# Patient Record
Sex: FEMALE | Race: WHITE
[De-identification: names, ages, dates, MRNs, and addresses within clinical notes are randomized per-mention and may not be internally consistent; named-entity substitution may affect disease eponyms.]

---

## 2018-01-30 ENCOUNTER — HOSPITAL ENCOUNTER (OUTPATIENT)
Dept: HOSPITAL 47 - RADMRIMAIN | Age: 48
Discharge: HOME | End: 2018-01-30
Attending: FAMILY MEDICINE
Payer: COMMERCIAL

## 2018-01-30 DIAGNOSIS — D49.2: Primary | ICD-10-CM

## 2018-01-30 DIAGNOSIS — M51.34: ICD-10-CM

## 2018-01-30 PROCEDURE — 72157 MRI CHEST SPINE W/O & W/DYE: CPT

## 2018-01-30 PROCEDURE — 82565 ASSAY OF CREATININE: CPT

## 2018-01-30 NOTE — MR
EXAMINATION TYPE: MR thoracic spine wo/w con

 

DATE OF EXAM: 1/30/2018

 

COMPARISON: NONE

 

HISTORY: Lytic lesion T4 vertebra per order. Abnormal x-ray at 's office per patient.

 

TECHNIQUE: Multiplanar, multisequence imaging of thoracic spine is performed without contrast

 

FINDINGS: Sagittal T2 counting sequence shows loss of normal cervical curvature with multilevel small
 posterior disc herniations effacing anterior thecal sac C3-C4 through C6-C7 levels.  Spinal cord jalil
ws normal course, caliber, and signal as it courses the thoracic spine.  Vertebral body heights and a
lignment are satisfactory. Vertebral body heights and alignment are satisfactory. Likely correspondin
g to x-ray abnormality there is lesion occupying nearly entire T4 vertebra of diffuse T1 hypointensit
y and T2 hyperintensity and heterogeneous postcontrast enhancement, there is no definitive posterior 
or pedicular extension. There is mild multilevel disc space narrowing with mild to moderate multileve
l spurring most prominent in lower thoracic levels. Multilevel small posterior disc herniations are s
een on sagittal images most prominent at T4-T5 and T6-T7 level effacing anterior thecal sac.

 

 Review of the axial images fails to show left paracentral disc protrusion mildly effacing anterior t
hecal sac C7-T1 level seen best sagittal image 8, bilateral neural foramina are patent.

 

Axial images at T2-T3 level show left paracentral disc protrusion effacing anterolateral thecal sac a
xial image 20, bilateral neural foramina are patent.

 

Axial images at T4 level show well-defined heterogeneous enhancing lesion occupying majority of right
 T4 vertebra without adjacent soft tissue mass or cortical destruction/breakthrough seen best axial i
mage 12.

 

Axial images at T4-T5 level show lobulated posterior disc protrusion effacing anterolateral thecal sa
c, bilateral neural foramina are patent.

 

Axial images at T5-T6 level show lobulated posterior paracentral disc protrusion effacing anterolater
al thecal sac, bilateral neural foramina are patent.

 

Axial images at T6-T7 level show left paracentral disc protrusion effacing anterolateral thecal sac, 
bilateral neural foramina are patent. 

 

Axial images at T9-T10 level show right paracentral disc protrusion effacing anterolateral thecal sac
 near axial image 11, and causing asymmetric mild to moderate right-sided neural foraminal narrowing.
 Left-sided neural foramen is patent.

 

Axial images at T10-T11 level shows paracentral disc protrusion effacing anterolateral thecal sac, bi
lateral neural foramina are patent mild ligamentum flavum hypertrophy is seen.

 

Axial images at T12-L1 level show focal left paracentral disc protrusion effacing anterolateral theca
l sac on axial image 2 series 601, bilateral neural foramina are patent.

 

Visualized thorax and upper abdomen show no suspicious abnormality.

 

IMPRESSION: Nonspecific abnormal lesion T4 vertebra. No obvious aggressive features on MRI however ba
sed on history lytic metastatic disease and myeloma remain in differential. Consider hematology oncol
ogy referral and advised correlation with bone survey. Multilevel degenerative changes in thoracic sp
ine are seen as detailed above.

## 2019-04-27 ENCOUNTER — HOSPITAL ENCOUNTER (EMERGENCY)
Dept: HOSPITAL 47 - EC | Age: 49
Discharge: HOME | End: 2019-04-27
Payer: COMMERCIAL

## 2019-04-27 VITALS — SYSTOLIC BLOOD PRESSURE: 133 MMHG | HEART RATE: 76 BPM | DIASTOLIC BLOOD PRESSURE: 77 MMHG

## 2019-04-27 VITALS — RESPIRATION RATE: 18 BRPM

## 2019-04-27 VITALS — TEMPERATURE: 100.4 F

## 2019-04-27 DIAGNOSIS — Z88.1: ICD-10-CM

## 2019-04-27 DIAGNOSIS — J18.1: Primary | ICD-10-CM

## 2019-04-27 LAB
PH UR: 5.5 [PH] (ref 5–8)
RBC UR QL: 1 /HPF (ref 0–5)
SP GR UR: 1.02 (ref 1–1.03)
SQUAMOUS UR QL AUTO: 8 /HPF (ref 0–4)
UROBILINOGEN UR QL STRIP: <2 MG/DL (ref ?–2)
WBC #/AREA URNS HPF: 2 /HPF (ref 0–5)

## 2019-04-27 PROCEDURE — 87086 URINE CULTURE/COLONY COUNT: CPT

## 2019-04-27 PROCEDURE — 87502 INFLUENZA DNA AMP PROBE: CPT

## 2019-04-27 PROCEDURE — 81001 URINALYSIS AUTO W/SCOPE: CPT

## 2019-04-27 PROCEDURE — 71046 X-RAY EXAM CHEST 2 VIEWS: CPT

## 2019-04-27 PROCEDURE — 99284 EMERGENCY DEPT VISIT MOD MDM: CPT

## 2019-04-27 NOTE — XR
EXAMINATION TYPE: XR chest 2V

 

DATE OF EXAM: 4/27/2019

 

HISTORY: Fever.

 

REFERENCE: NONE.

 

FINDINGS: There is a left lower lobe infiltrate. Right lung is clear. Pleural space are clear. The he
art is not enlarged.

 

IMPRESSION: 

 

LEFT LOWER LOBE PNEUMONIA.

## 2019-04-27 NOTE — ED
Fever HPI





- General


Chief Complaint: Fever


Stated Complaint: Fever


Time Seen by Provider: 19 11:34


Source: patient, RN notes reviewed, old records reviewed


Mode of arrival: ambulatory


Limitations: no limitations





- History of Present Illness


Initial Comments: 





Patient's a 48-year-old female presents emergency room today with complaints of 

fever bodyaches for the past 2 days.  Patient has had Motrin Tylenol for the 

past 2 days she complies a slight cough today.  She denies any history of sick 

contacts.  She is otherwise generally healthy.  Denies any other symptoms.





- Related Data


                                  Previous Rx's











 Medication  Instructions  Recorded


 


Albuterol Inhaler [Ventolin Hfa 1 - 2 puff INHALATION RT-Q6H PRN 19





Inhaler] #1 inhaler 


 


Azithromycin [Zithromax Z-pack] 250 mg PO AS DIRECTED #6 tab 19


 


methylPREDNISolone Dose Pack 4 mg PO AS DIRECTED #21 package 19





[Medrol Dose Pack]  











                                    Allergies











Allergy/AdvReac Type Severity Reaction Status Date / Time


 


cefaclor [From Ceclor] Allergy  Rash/Hives Verified 19 11:24














Review of Systems


ROS Statement: 


Those systems with pertinent positive or pertinent negative responses have been 

documented in the HPI.





ROS Other: All systems not noted in ROS Statement are negative.





Past Medical History


Past Medical History: No Reported History


History of Any Multi-Drug Resistant Organisms: None Reported


Past Surgical History:  Section


Additional Past Surgical History / Comment(s): kidney biopsy


Past Psychological History: No Psychological Hx Reported


Smoking Status: Never smoker


Past Alcohol Use History: None Reported


Past Drug Use History: None Reported





General Exam





- General Exam Comments


Initial Comments: 





Well-appearing alert and oriented 48-year-old female.  No significant distress.


Limitations: no limitations


General appearance: alert, in no apparent distress


Head exam: Present: atraumatic, normocephalic, normal inspection


Eye exam: Present: normal appearance, PERRL, EOMI.  Absent: scleral icterus, 

conjunctival injection, periorbital swelling


ENT exam: Present: normal exam, mucous membranes moist


Neck exam: Present: normal inspection.  Absent: tenderness, meningismus, 

lymphadenopathy


Respiratory exam: Present: normal lung sounds bilaterally, other (Slight cough).

 Absent: respiratory distress, wheezes, rales, rhonchi, stridor


Cardiovascular Exam: Present: regular rate, normal rhythm, normal heart sounds. 

Absent: systolic murmur, diastolic murmur, rubs, gallop, clicks


GI/Abdominal exam: Present: soft, normal bowel sounds.  Absent: distended, 

tenderness, guarding, rebound, rigid


Extremities exam: Present: normal inspection, full ROM, normal capillary refill.

 Absent: tenderness, pedal edema, joint swelling, calf tenderness


Back exam: Present: normal inspection


Neurological exam: Present: alert, oriented X3, CN II-XII intact


Psychiatric exam: Present: normal affect, normal mood


Skin exam: Present: warm





Course





                                   Vital Signs











  19





  11:19 11:33


 


Temperature  100.6 F H


 


Pulse Rate 84 


 


Respiratory 18 





Rate  


 


Blood Pressure 150/73 


 


O2 Sat by Pulse 96 





Oximetry  














Medical Decision Making





- Medical Decision Making





Patient is a 48-year-old female presents return today with complaints of fever 

and body aches for the past 3 days.  She also complains of a slight cough.  No 

other significant symptoms.  Patient's influenza test is negative.  She is given

Motrin Tylenol she was febrile here.  No abdominal pain or other concerning 

signs or symptoms.  Patient's chest x-ray shows evidence of left lower lobe 

pneumonia.  Started on azithromycin.  Will discharge Patient with a prescription

for antibiotics steroids and inhaler.  Discussed alternating Motrin and Tylenol.

 All questions answered.





- Lab Data





                                   Lab Results











  19 Range/Units





  12:12 12:12 


 


Urine Color   Yellow  


 


Urine Appearance   Cloudy H  (Clear)  


 


Urine pH   5.5  (5.0-8.0)  


 


Ur Specific Gravity   1.022  (1.001-1.035)  


 


Urine Protein   Trace H  (Negative)  


 


Urine Glucose (UA)   Negative  (Negative)  


 


Urine Ketones   Negative  (Negative)  


 


Urine Blood   Negative  (Negative)  


 


Urine Nitrite   Negative  (Negative)  


 


Urine Bilirubin   Negative  (Negative)  


 


Urine Urobilinogen   <2.0  (<2.0)  mg/dL


 


Ur Leukocyte Esterase   Negative  (Negative)  


 


Urine RBC   1  (0-5)  /hpf


 


Urine WBC   2  (0-5)  /hpf


 


Ur Squamous Epith Cells   8 H  (0-4)  /hpf


 


Urine Mucus   Many H  (None)  /hpf


 


Influenza Type A RNA  Not Detected   (Not Detectd)  


 


Influenza Type B (PCR)  Not Detected   (Not Detectd)  














- Radiology Data


Radiology results: report reviewed





Chest x-ray shows evidence of left lower lobe pneumonia.





Disposition


Clinical Impression: 


 Pneumonia





Disposition: HOME SELF-CARE


Condition: Good


Instructions (If sedation given, give patient instructions):  Fever in Adults 

(ED), Community Acquired Pneumonia (ED)


Additional Instructions: 


Patient advised to rest, remain hydrated.  Alternate between Motrin and Tylenol 

as discussed every 4 hours.  Take medications as prescribed.  Return to emerge

ncy department if any alarming signs or symptoms occur.


Prescriptions: 


methylPREDNISolone Dose Pack [Medrol Dose Pack] 4 mg PO AS DIRECTED #21 package


Albuterol Inhaler [Ventolin Hfa Inhaler] 1 - 2 puff INHALATION RT-Q6H PRN #1 

inhaler


 PRN Reason: Shortness Of Breath


Azithromycin [Zithromax Z-pack] 250 mg PO AS DIRECTED #6 tab


Is patient prescribed a controlled substance at d/c from ED?: No


Referrals: 


Regina Ortiz DO [Primary Care Provider] - 1-2 days


Time of Disposition: 13:14

## 2022-10-03 ENCOUNTER — HOSPITAL ENCOUNTER (OUTPATIENT)
Dept: HOSPITAL 47 - LABPAT | Age: 52
Discharge: HOME | End: 2022-10-03
Attending: ORTHOPAEDIC SURGERY
Payer: COMMERCIAL

## 2022-10-03 DIAGNOSIS — Z01.818: Primary | ICD-10-CM

## 2022-10-03 DIAGNOSIS — Z22.322: ICD-10-CM

## 2022-10-03 LAB
ANION GAP SERPL CALC-SCNC: 10.5 MMOL/L (ref 10–18)
BASOPHILS # BLD AUTO: 0.05 X 10*3/UL (ref 0–0.1)
BASOPHILS NFR BLD AUTO: 0.6 %
BUN SERPL-SCNC: 15.2 MG/DL (ref 9–27)
BUN/CREAT SERPL: 20.29 RATIO (ref 12–20)
CALCIUM SPEC-MCNC: 9.7 MG/DL (ref 8.7–10.3)
CHLORIDE SERPL-SCNC: 101 MMOL/L (ref 96–109)
CO2 SERPL-SCNC: 28.4 MMOL/L (ref 20–27.5)
EOSINOPHIL # BLD AUTO: 0.16 X 10*3/UL (ref 0.04–0.35)
EOSINOPHIL NFR BLD AUTO: 1.9 %
ERYTHROCYTE [DISTWIDTH] IN BLOOD BY AUTOMATED COUNT: 4.78 X 10*6/UL (ref 4.1–5.2)
ERYTHROCYTE [DISTWIDTH] IN BLOOD: 13.7 % (ref 11.5–14.5)
GLUCOSE SERPL-MCNC: 107 MG/DL (ref 70–110)
HCT VFR BLD AUTO: 41.8 % (ref 37.2–46.3)
HGB BLD-MCNC: 13.6 G/DL (ref 12–15)
IMM GRANULOCYTES BLD QL AUTO: 0.5 %
INR PPP: 0.93 (ref 0.9–1.11)
LYMPHOCYTES # SPEC AUTO: 2.29 X 10*3/UL (ref 0.9–5)
LYMPHOCYTES NFR SPEC AUTO: 27.7 %
MCH RBC QN AUTO: 28.5 PG (ref 27–32)
MCHC RBC AUTO-ENTMCNC: 32.5 G/DL (ref 32–37)
MCV RBC AUTO: 87.4 FL (ref 80–97)
MONOCYTES # BLD AUTO: 0.76 X 10*3/UL (ref 0.2–1)
MONOCYTES NFR BLD AUTO: 9.2 %
NEUTROPHILS # BLD AUTO: 4.96 X 10*3/UL (ref 1.8–7.7)
NEUTROPHILS NFR BLD AUTO: 60.1 %
NRBC BLD AUTO-RTO: 0 /100 WBCS (ref 0–0)
PLATELET # BLD AUTO: 366 X 10*3/UL (ref 140–440)
POTASSIUM SERPL-SCNC: 4.8 MMOL/L (ref 3.5–5.5)
PT BLD: 10.3 SEC (ref 9.9–11.9)
SODIUM SERPL-SCNC: 140 MMOL/L (ref 135–145)
WBC # BLD AUTO: 8.26 X 10*3/UL (ref 4.5–10)

## 2022-10-03 PROCEDURE — 36415 COLL VENOUS BLD VENIPUNCTURE: CPT

## 2022-10-03 PROCEDURE — 85610 PROTHROMBIN TIME: CPT

## 2022-10-03 PROCEDURE — 93005 ELECTROCARDIOGRAM TRACING: CPT

## 2022-10-03 PROCEDURE — 80048 BASIC METABOLIC PNL TOTAL CA: CPT

## 2022-10-03 PROCEDURE — 85025 COMPLETE CBC W/AUTO DIFF WBC: CPT

## 2022-10-13 ENCOUNTER — HOSPITAL ENCOUNTER (OUTPATIENT)
Dept: HOSPITAL 47 - OR | Age: 52
Discharge: HOME | End: 2022-10-13
Attending: ORTHOPAEDIC SURGERY
Payer: COMMERCIAL

## 2022-10-13 VITALS — DIASTOLIC BLOOD PRESSURE: 66 MMHG | HEART RATE: 67 BPM | SYSTOLIC BLOOD PRESSURE: 125 MMHG

## 2022-10-13 VITALS — BODY MASS INDEX: 46.3 KG/M2

## 2022-10-13 VITALS — TEMPERATURE: 97.6 F | RESPIRATION RATE: 16 BRPM

## 2022-10-13 DIAGNOSIS — R93.7: ICD-10-CM

## 2022-10-13 DIAGNOSIS — S24.112A: Primary | ICD-10-CM

## 2022-10-13 PROCEDURE — 88311 DECALCIFY TISSUE: CPT

## 2022-10-13 PROCEDURE — 72070 X-RAY EXAM THORAC SPINE 2VWS: CPT

## 2022-10-13 PROCEDURE — 88307 TISSUE EXAM BY PATHOLOGIST: CPT

## 2022-10-13 PROCEDURE — 20250 BIOPSY VRT BDY OPEN THORACIC: CPT

## 2022-10-13 NOTE — P.HPOR
History of Present Illness


H&P Date: 10/03/22


Chief Complaint: T4 lesion





.D:Date: 10/03/22 : 10:59am


.T:Title: [facility] History and 

Physical 





Date of Birth:[birth year]





Age: 52 year


Height: 5'1"


Weight: 250 lbs


BMI: 47.24 kg/m2


Occupation: N/A


VAS: 0 





CHIEF 

COMPLAINT:


 


cervical pain and thoracic lesion 





DOI:Chronic 





DOS: N/A





Duration of current treatment regiment: None 





HISTORY

:


 











Xrays


  brought xrays from outside facility which were reviewed New xrays taken in 

office 


 


Trauma or injury


  No 


 


Work-Related


  No 


 


Pain description


  aching, burning.


 


Location


  posterior 


Patient notes that their pain radiates to right upper extremity 





 


Activity Modification  yes 


 


Hand Dominance


  right 











TREATMENTS COMPLETED:











6 weeks of PT completed?





Month and Year of last PT date? None recent   No 











 


Physician recommended home exercise completed?





Duration of HEP course: >3 months 


 yes Patient has trialed the physician directed home exercise program 

(without) relief of their symptoms. 


 


Medications yes  List: Tylenol and Motrin PRN without relief





 


Alternative interventions Chiropractic: No


Massage therapy: No


R.I.C.E: yes , without relief 


Brace: No 


 


Injections  No 


RFA: No








SUBJECTIVE:

 


Ms. Siu presents to the office for an evaluation of their cervical pain

and thoracic lesion. To review, the patient presents on referral complaining of 

increased cervical pain and a T4 lesion found on a recently obtained MRI. 

Patient reports a aching, burning cervicaland thoracic pain ongoing for 4 

months with no known injury or trauma to indicate an exact onset of their 

symptoms. In addition to their cervical and thoracicpain, they do report that 

it radiates into the right upper extremity , associated numbness and tingling 

through C4-C7. Overall the patient has seen a progressive increase in symptoms 

since their onset. Ms. Siu symptoms are exacerbated with flexion, 

extension, and prolonged standing, due to this they notes that it is 

increasingly difficult for Ms. Siu to complete many of their daily 

tasks. Patient is having severe sleep disturbances as well due to their ongoing 

pain and associated symptoms. Regarding treatments, the patient has previously 

trialed all abovementioned modalties without relief of her symptoms. Patient 

denies trialing any other modalities at this time. For their symptoms, the 

patient has been taking Tylenol and Motrin without relief of her symptoms. 

Otherwise the patient denies any f/c/sob/cp, no incision concerns, no bladder or

bowel retention/incontinence, no perineal numbness/tingling, and ambulates 

independently. 





The patients' past social, medical, family, surgical history, as well as review 

of systems, have been reviewed. Please refer to the Neurosurgery History and 

Physical form that has been scanned in to our electronic medical record system.





16 points review of systems completed and as stated in HPI, all other systems 

reviewed are negative. 





Social History: Reviewed, see appropriate section of the chart for details. 


P3 Social History:


Smoking: none P3 


Alcohol: none P3 





Family History: Reviewed, see appropriate section of the chart for details.  

P2 





Past Medical History: Reviewed, see appropriate section of the chart for 

details. 





P1 Current Medications:


Rx: Calcium 600 + D(3) 600 mg-5 mcg (200 unit) tablet Ref: 0 


Rx: Fish OiL Ref: 0 


Rx: magnesium Ref: 0 


Rx: multivitamin Ref: 0 


Rx: red yeast rice 600 mg capsule Ref: 0 


Rx: Vitamin C Ref: 0 


Rx: Zinc Natural Ref: 0 P1 





PHYSICAL EXAMINATION:




 





General: Awake, alert, appropriate for age, in no acute distress. 


HEENT: No unusual neck masses around region of lateral neck triangle, thyroid,

supraclavicular groove 


Heart: Regular rate and rhythm, normal S1, S2 and no murmur/gallop. 


Lungs: Clear to auscultation bilaterally with no use of accessory muscles. 


Extremities: Skin warm and dry without acute lesions, coloration, temperature,

skin intact, no tenderness or erythema 





Integument: 


Hairy patches: ABSENT


Dorsal skin dimples: ABSENT


Cafe au lait spots: ABSENT


Surgical incisions: NONE 





Palpation: 


Please see Pain drawing on Intake sheet for further detail. 


Midline spinal tenderness: No E6


Cervical Tenderness: No E6


Paralumbar tenderness: No E6 


Parathoracic tenderness: No E6


Buttocks tenderness: No E6


Sacroiliac Tenderness: No





POSTURAL and MUSCULO-SKELETAL EVALUATION: 


Coronal Balance: NEUTRAL


Recumbent testing: Patient is able to lay flat on back 


Sagittal Balance: NEUTRAL


Shoulder Profile: LEVEL


Pelvic Girdle: LEVEL


Neck ROM:  RESTRICTED


Lumbar ROM: UNRESTRICTED


Shoulder ROM: Symmetrical


Hip ROM: Symmetrical


Knee ROM: Symmetrical


Hands: Normal appearance, symmetrical


Feet: Normal appearance, Symmetrical





VASCULAR STATUS :


  

LEFT RIGHT 











Wrist Pulses  INTACT INTACT


 


Pedal Pulses 


(Dors. pedis & post.tibialis)


  INTACT


  INTACT


 


Color  NORMAL  NORMAL


 


Edema Absent  Absent 








NEUROLOGIC EXAMINATION: 





Mental Status:Awake and alert, fully oriented, with normal attention, 

concentration and memory, and fluent, appropriate speech. 





Cranial Nerves: 


I: Olfactory not tested. 


II: Visual acuity normal, no visual field deficit noted with confrontation. 


III,IV: Normal pupillary reflexes & intact extraocular movements without 

nystagmus. 


V,VI: Intact symmetrical facial sensation. 


VII: Intact symmetrical facial motor movement 


VIII: Hearing intact. 


IX,X: Intact gag, swallow, & normal voice. 


XI: Sternocleidomastoid, trapezius function intact. 


XII: Tongue midline with normal movements. 





L'hermitte's Sign:  Negative / absent 


Spurling'Sign: Absent bilaterally.  


Cubital percussion test:  Absent bilaterally. 


Larson-Tinel sign - Carpal region:  Absent bilaterally. 


Straight Leg Raising:  Absent bilaterally. 


Crossed straight leg raise: negative O8 





MOTOR EXAM (0-5/5, N/T)











     UPPER EXTREMITY Shoulder Abduction Biceps Triceps Wrist Extension Hand 


Intrinsics 


 


Right  4+/5  4+/5  4+/5  4+/5  4+/5  4+/5


 


Left  5/5  5/5  5/5  5/5  5/5  5/5














LOWER EXTREMITY Hip Flexion Knee Extension Knee Flexion DF PF EHL FHL


 


Right  5/5  5/5  5/5  5/5  5/5  5/5  5/5


 


Left  5/5  5/5  5/5  5/5  5/5  5/5  5/5








REFLEXES(0-4/2, NT)Upper 

ExtremityLower Extremity











Right  2  2


 


Left  2  2 











Pathological Reflexes 

RIGHT LEFT











Larson's Absent  Absent 


 


Clonus Absent  Absent 


 


Babinski Absent  Absent 








# Indicates mechanical impairment 





Muscle appearance:  Symmetrical, without signs of atrophy or dystrophy. 








Sensory system (0-4, N/T) 











                              Test type RU HÉCTOR RL LL


 


Joint-Position 2 2  2  2 


 


Vibration 2  2  2  2 


 


Pain & LT sense 2  2  2  2 


 


Dermatomal Deficit:


                                      C4-C7


 None 








Gait and Functional Evaluation: 


Ambulatory aids:  Independent 


Romberg's test:  Intact bilaterally 


Toe heel walk / heel-toe walk intact while maintaining satisfactory balance? 

yes  


Squatting/straightening w/o assistance to a min of 60 degree knee flexion? yes 

 


Single leg stance:  intact 


Trendelenburg sign negative bilaterally 


Hand and finger dexterity intact bilaterally?  yes 


Disdiadochokinesis examination negative bilaterally?  yes  








RADIOGRAPHIC 

STUDIES:


 





XRay taken on 10/03/22 of Cervical, Thoracic Spine: 


images reviewed demonstrate cervical spondylosis with reversal of the normal 

cervical lordosis. This is centered around C4 through C7 due to disc collapse 

and osteophytic formation. There are no acute fractures or dislocations 

otherwise noted. Cervical thoracic junction is stable occipital cervical region 

is stable.kyphosis is measured at 15 past neutral.





MRI scan from 2022 of Thoracic Spine: 


images reviewed with the patient demonstrate a T4 lesion that is in the 

vertebral body anteriorly and eccentric to the right-hand side. This is an 

expansile type lesion which extends out past the vertebral body wall on the 

anterior and right lateral portion that does not extend posteriorly into the 

canal and causes no stenosis. It is dark on T1 and bright on T2 and enhances on

STIR images. It is uniform in nature there is no evidence of heterogeneously. 

There is no fracture associated with this vertebral body height is maintained. 

There are no other lesions noted. There is minor spondylotic changes throughout

the thoracic spine





MRI scan from 2022 of Cervical Spine: 


images reviewed demonstrate spondylosis of C4 through C7 with disc height 

collapse as well as facet arthropathy. There is reversal of the normal cervical

lordosis and about 15 of cervical kyphosis centered around these levels. This 

causes stenosis due to disc herniations and disc bulges from C4 through C7 which

is mild at C4-C5 severe at C5-C6 and moderate at C6-C7. There is no 

myelomalacia noted at this time. No other fractures dislocations or lesions 

noted in the cervical spine





Bone scan from 2022 demonstrates: 


bone scan is reviewed the T4 lesion does not enhance on bone scan. There are no

other areas of uptake or metastatic potential.





 

IMPRESSION:


 





It was my pleasure to have seen and examined [place]. I reviewed the patient's

clinical syndrome, physical findings, and imaging studies during the appointment

today. It is my impression that the patient has a diagnosis of. 





1.  T4 lytic lesion with marrow conversion properties on MRI





2.  C4-C7 spondylosis with stenosis 





3.  right upper extremity radiculopathy 





I outlined the natural course history without intervention and various 

interventional options. 








 

PLAN:


 





Based on my findings I suggest the following course of action: 





- Ordered a CT scan without contrast of the chest, abdomen, and pelvis for 

further evaluation of her lytic lesion and to rule out metastatic disease. 





- Patient given a script for Motrin 800mg to help relieve any ongoing 

inflammatory pain.





- Patient given a script for Gabapentin 300mg to help relieve her ongoing 

radicular symptoms.





-Ordered a PET scan for further evaluation of her Lytic lesion and to rule out 

any metastatic disease





-I discussed treatment options with the patient, including operative and non-

operative options, and they have elected to proceed with the following surgical 

procedure: 





  thoracic (T4) biopsy of lytic lesion and possible kyphoplasty





The indications, risks, benefits, and alternatives to surgery were discussed 

with the patient at length. Specifically (but not limited to) the risks of 

infection, stiffness, recurrence of symptoms, need for revision surgery, local 

numbness, neurovascular injury, and blood clots were discussed. The patient's 

questions were answered. The decision to proceed was made. Consent will be 

obtained for the procedure. 





-Ambulate daily 





-Take medications as directed





-Ice and rest for pain and swelling control.


 





Follow-up:

 





Post procedure 





Patient Education: (Informational booklet, instructions, etc) given at today's 

appointment: Yes


.ED:Patient Education: Y 





Plan at next visit: X-ray , review progress





Medications Reviewed: YES





In our visit today Ms. Siu and I have had a chance to go over my un

derstanding of the patient's current condition, the natural course history 

without intervention and various interventional options. Questions were invited 

and answered, and the patient wishes to proceed as outlined above. 





I will be sure to keep you updated afterMs. Siu returns here for 

further follow-up. Thank you again for your referral. Please do not hesitate to 

contact me if you have any further questions. 


  


Signed and authenticated by: 





[facility] 


Complex and Minimally Invasive Spine Surgery 


[address] [facility], [address] [address] 


[phone] 


Fax: [phone] 





This message is confidential, intended only for the named recipient(s) and may 

contain information that is privileged or exempt from disclosure under 

applicable law. If you are not the intended recipient(s), you are notified that

the dissemination, distribution or copying of this information is strictly 

prohibited. If you received this message in error, please notify the sender 

then delete this message. 





Patient verbalizes understanding of the information discussed.














Past Medical History


Past Medical History: Renal Disease, Thyroid Disorder


Additional Past Medical History / Comment(s): Nephrotic Syndrome as child, no 

problems since . Thyroid nodules.


History of Any Multi-Drug Resistant Organisms: None Reported


Past Surgical History:  Section


Additional Past Surgical History / Comment(s): Kidney biopsy .


Past Anesthesia/Blood Transfusion Reactions: No Reported Reaction


Past Psychological History: No Psychological Hx Reported


Smoking Status: Never smoker


Past Alcohol Use History: None Reported


Past Drug Use History: None Reported





- Past Family History


  ** Mother


Family Medical History: Cancer


Additional Family Medical History / Comment(s): Colorectal cancer.





Medications and Allergies


                                Home Medications











 Medication  Instructions  Recorded  Confirmed  Type


 


Fish Oil/Dha/Epa [Fish Oil 1,200 1 each PO DAILY 09/26/22 10/11/22 History





mg Fish Oil]    


 


Multivitamins, Thera [Multivitamin 1 tab PO DAILY 09/26/22 10/11/22 History





(formulary)]    








                                    Allergies











Allergy/AdvReac Type Severity Reaction Status Date / Time


 


[facility] Allergy  Rash/Hives Verified 10/11/22 15:45














Physical Examination


Osteopathic Statement: *.  No significant issues noted on an osteopathic 

structural exam other than those noted in the History and Physical/Consult.

## 2022-10-13 NOTE — P.PN
Progress Note - Text


Progress Note Date: 10/13/22





Brief Post op:





Pt s/e in PACU. She is sleepy but following commands well.  Moves all 4 ext with

good strength.  She has good sensation in all 4 ext.  Her VSS are stable at this

time. She will be transferred to Phase II when awake and stable per PACU staff 

and DC home when she meets home criteria.  We will await biopsy results and she 

will visit us in 2 weeks in the office.  DC instructions placed.

## 2022-10-13 NOTE — P.HPOR
History of Present Illness


H&P Date: 10/03/22


Chief Complaint: T4 lesion





.D:Date: 10/03/22 : 10:59am


.T:Title: [facility] History and 

Physical 





Date of Birth:[birth year]





Age: 52 year


Height: 5'1"


Weight: 250 lbs


BMI: 47.24 kg/m2


Occupation: N/A


VAS: 0 





CHIEF 

COMPLAINT:


 


cervical pain and thoracic lesion 





DOI:Chronic 





DOS: N/A





Duration of current treatment regiment: None 





HISTORY

:


 











Xrays


  brought xrays from outside facility which were reviewed New xrays taken in 

office 


 


Trauma or injury


  No 


 


Work-Related


  No 


 


Pain description


  aching, burning.


 


Location


  posterior 


Patient notes that their pain radiates to right upper extremity 





 


Activity Modification  yes 


 


Hand Dominance


  right 











TREATMENTS COMPLETED:











6 weeks of PT completed?





Month and Year of last PT date? None recent   No 











 


Physician recommended home exercise completed?





Duration of HEP course: >3 months 


 yes Patient has trialed the physician directed home exercise program 

(without) relief of their symptoms. 


 


Medications yes  List: Tylenol and Motrin PRN without relief





 


Alternative interventions Chiropractic: No


Massage therapy: No


R.I.C.E: yes , without relief 


Brace: No 


 


Injections  No 


RFA: No








SUBJECTIVE:

 


Ms. Siu presents to the office for an evaluation of their cervical pain

and thoracic lesion. To review, the patient presents on referral complaining of 

increased cervical pain and a T4 lesion found on a recently obtained MRI. 

Patient reports a aching, burning cervicaland thoracic pain ongoing for 4 

months with no known injury or trauma to indicate an exact onset of their 

symptoms. In addition to their cervical and thoracicpain, they do report that 

it radiates into the right upper extremity , associated numbness and tingling 

through C4-C7. Overall the patient has seen a progressive increase in symptoms 

since their onset. Ms. Siu symptoms are exacerbated with flexion, 

extension, and prolonged standing, due to this they notes that it is 

increasingly difficult for Ms. Siu to complete many of their daily 

tasks. Patient is having severe sleep disturbances as well due to their ongoing 

pain and associated symptoms. Regarding treatments, the patient has previously 

trialed all abovementioned modalties without relief of her symptoms. Patient 

denies trialing any other modalities at this time. For their symptoms, the 

patient has been taking Tylenol and Motrin without relief of her symptoms. 

Otherwise the patient denies any f/c/sob/cp, no incision concerns, no bladder or

bowel retention/incontinence, no perineal numbness/tingling, and ambulates 

independently. 





The patients' past social, medical, family, surgical history, as well as review 

of systems, have been reviewed. Please refer to the Neurosurgery History and 

Physical form that has been scanned in to our electronic medical record system.





16 points review of systems completed and as stated in HPI, all other systems 

reviewed are negative. 





Social History: Reviewed, see appropriate section of the chart for details. 


P3 Social History:


Smoking: none P3 


Alcohol: none P3 





Family History: Reviewed, see appropriate section of the chart for details.  

P2 





Past Medical History: Reviewed, see appropriate section of the chart for 

details. 





P1 Current Medications:


Rx: Calcium 600 + D(3) 600 mg-5 mcg (200 unit) tablet Ref: 0 


Rx: Fish OiL Ref: 0 


Rx: magnesium Ref: 0 


Rx: multivitamin Ref: 0 


Rx: red yeast rice 600 mg capsule Ref: 0 


Rx: Vitamin C Ref: 0 


Rx: Zinc Natural Ref: 0 P1 





PHYSICAL EXAMINATION:




 





General: Awake, alert, appropriate for age, in no acute distress. 


HEENT: No unusual neck masses around region of lateral neck triangle, thyroid,

supraclavicular groove 


Heart: Regular rate and rhythm, normal S1, S2 and no murmur/gallop. 


Lungs: Clear to auscultation bilaterally with no use of accessory muscles. 


Extremities: Skin warm and dry without acute lesions, coloration, temperature,

skin intact, no tenderness or erythema 





Integument: 


Hairy patches: ABSENT


Dorsal skin dimples: ABSENT


Cafe au lait spots: ABSENT


Surgical incisions: NONE 





Palpation: 


Please see Pain drawing on Intake sheet for further detail. 


Midline spinal tenderness: No E6


Cervical Tenderness: No E6


Paralumbar tenderness: No E6 


Parathoracic tenderness: No E6


Buttocks tenderness: No E6


Sacroiliac Tenderness: No





POSTURAL and MUSCULO-SKELETAL EVALUATION: 


Coronal Balance: NEUTRAL


Recumbent testing: Patient is able to lay flat on back 


Sagittal Balance: NEUTRAL


Shoulder Profile: LEVEL


Pelvic Girdle: LEVEL


Neck ROM:  RESTRICTED


Lumbar ROM: UNRESTRICTED


Shoulder ROM: Symmetrical


Hip ROM: Symmetrical


Knee ROM: Symmetrical


Hands: Normal appearance, symmetrical


Feet: Normal appearance, Symmetrical





VASCULAR STATUS :


  

LEFT RIGHT 











Wrist Pulses  INTACT INTACT


 


Pedal Pulses 


(Dors. pedis & post.tibialis)


  INTACT


  INTACT


 


Color  NORMAL  NORMAL


 


Edema Absent  Absent 








NEUROLOGIC EXAMINATION: 





Mental Status:Awake and alert, fully oriented, with normal attention, 

concentration and memory, and fluent, appropriate speech. 





Cranial Nerves: 


I: Olfactory not tested. 


II: Visual acuity normal, no visual field deficit noted with confrontation. 


III,IV: Normal pupillary reflexes & intact extraocular movements without 

nystagmus. 


V,VI: Intact symmetrical facial sensation. 


VII: Intact symmetrical facial motor movement 


VIII: Hearing intact. 


IX,X: Intact gag, swallow, & normal voice. 


XI: Sternocleidomastoid, trapezius function intact. 


XII: Tongue midline with normal movements. 





L'hermitte's Sign:  Negative / absent 


Spurling'Sign: Absent bilaterally.  


Cubital percussion test:  Absent bilaterally. 


Larson-Tinel sign - Carpal region:  Absent bilaterally. 


Straight Leg Raising:  Absent bilaterally. 


Crossed straight leg raise: negative O8 





MOTOR EXAM (0-5/5, N/T)











     UPPER EXTREMITY Shoulder Abduction Biceps Triceps Wrist Extension Hand 


Intrinsics 


 


Right  4+/5  4+/5  4+/5  4+/5  4+/5  4+/5


 


Left  5/5  5/5  5/5  5/5  5/5  5/5














LOWER EXTREMITY Hip Flexion Knee Extension Knee Flexion DF PF EHL FHL


 


Right  5/5  5/5  5/5  5/5  5/5  5/5  5/5


 


Left  5/5  5/5  5/5  5/5  5/5  5/5  5/5








REFLEXES(0-4/2, NT)Upper 

ExtremityLower Extremity











Right  2  2


 


Left  2  2 











Pathological Reflexes 

RIGHT LEFT











Larson's Absent  Absent 


 


Clonus Absent  Absent 


 


Babinski Absent  Absent 








# Indicates mechanical impairment 





Muscle appearance:  Symmetrical, without signs of atrophy or dystrophy. 








Sensory system (0-4, N/T) 











                              Test type RU HÉCTOR RL LL


 


Joint-Position 2 2  2  2 


 


Vibration 2  2  2  2 


 


Pain & LT sense 2  2  2  2 


 


Dermatomal Deficit:


                                      C4-C7


 None 








Gait and Functional Evaluation: 


Ambulatory aids:  Independent 


Romberg's test:  Intact bilaterally 


Toe heel walk / heel-toe walk intact while maintaining satisfactory balance? 

yes  


Squatting/straightening w/o assistance to a min of 60 degree knee flexion? yes 

 


Single leg stance:  intact 


Trendelenburg sign negative bilaterally 


Hand and finger dexterity intact bilaterally?  yes 


Disdiadochokinesis examination negative bilaterally?  yes  








RADIOGRAPHIC 

STUDIES:


 





XRay taken on 10/03/22 of Cervical, Thoracic Spine: 


images reviewed demonstrate cervical spondylosis with reversal of the normal 

cervical lordosis. This is centered around C4 through C7 due to disc collapse 

and osteophytic formation. There are no acute fractures or dislocations 

otherwise noted. Cervical thoracic junction is stable occipital cervical region 

is stable.kyphosis is measured at 15 past neutral.





MRI scan from 2022 of Thoracic Spine: 


images reviewed with the patient demonstrate a T4 lesion that is in the 

vertebral body anteriorly and eccentric to the right-hand side. This is an 

expansile type lesion which extends out past the vertebral body wall on the 

anterior and right lateral portion that does not extend posteriorly into the 

canal and causes no stenosis. It is dark on T1 and bright on T2 and enhances on

STIR images. It is uniform in nature there is no evidence of heterogeneously. 

There is no fracture associated with this vertebral body height is maintained. 

There are no other lesions noted. There is minor spondylotic changes throughout

the thoracic spine





MRI scan from 2022 of Cervical Spine: 


images reviewed demonstrate spondylosis of C4 through C7 with disc height 

collapse as well as facet arthropathy. There is reversal of the normal cervical

lordosis and about 15 of cervical kyphosis centered around these levels. This 

causes stenosis due to disc herniations and disc bulges from C4 through C7 which

is mild at C4-C5 severe at C5-C6 and moderate at C6-C7. There is no 

myelomalacia noted at this time. No other fractures dislocations or lesions 

noted in the cervical spine





Bone scan from 2022 demonstrates: 


bone scan is reviewed the T4 lesion does not enhance on bone scan. There are no

other areas of uptake or metastatic potential.





 

IMPRESSION:


 





It was my pleasure to have seen and examined [place]. I reviewed the patient's

clinical syndrome, physical findings, and imaging studies during the appointment

today. It is my impression that the patient has a diagnosis of. 





1.  T4 lytic lesion with marrow conversion properties on MRI





2.  C4-C7 spondylosis with stenosis 





3.  right upper extremity radiculopathy 





I outlined the natural course history without intervention and various 

interventional options. 








 

PLAN:


 





Based on my findings I suggest the following course of action: 





- Ordered a CT scan without contrast of the chest, abdomen, and pelvis for 

further evaluation of her lytic lesion and to rule out metastatic disease. 





- Patient given a script for Motrin 800mg to help relieve any ongoing 

inflammatory pain.





- Patient given a script for Gabapentin 300mg to help relieve her ongoing 

radicular symptoms.





-Ordered a PET scan for further evaluation of her Lytic lesion and to rule out 

any metastatic disease





-I discussed treatment options with the patient, including operative and non-

operative options, and they have elected to proceed with the following surgical 

procedure: 





  thoracic (T4) biopsy of lytic lesion and possible kyphoplasty





The indications, risks, benefits, and alternatives to surgery were discussed 

with the patient at length. Specifically (but not limited to) the risks of 

infection, stiffness, recurrence of symptoms, need for revision surgery, local 

numbness, neurovascular injury, and blood clots were discussed. The patient's 

questions were answered. The decision to proceed was made. Consent will be 

obtained for the procedure. 





-Ambulate daily 





-Take medications as directed





-Ice and rest for pain and swelling control.


 





Follow-up:

 





Post procedure 





Patient Education: (Informational booklet, instructions, etc) given at today's 

appointment: Yes


.ED:Patient Education: Y 





Plan at next visit: X-ray , review progress





Medications Reviewed: YES





In our visit today Ms. Siu and I have had a chance to go over my un

derstanding of the patient's current condition, the natural course history 

without intervention and various interventional options. Questions were invited 

and answered, and the patient wishes to proceed as outlined above. 





I will be sure to keep you updated afterMs. Siu returns here for 

further follow-up. Thank you again for your referral. Please do not hesitate to 

contact me if you have any further questions. 


  


Signed and authenticated by: 





[facility] 


Complex and Minimally Invasive Spine Surgery 


[address] [facility], [address] [address] 


[phone] 


Fax: [phone] 





This message is confidential, intended only for the named recipient(s) and may 

contain information that is privileged or exempt from disclosure under 

applicable law. If you are not the intended recipient(s), you are notified that

the dissemination, distribution or copying of this information is strictly 

prohibited. If you received this message in error, please notify the sender 

then delete this message. 





Patient verbalizes understanding of the information discussed.














Past Medical History


Past Medical History: Renal Disease, Thyroid Disorder


Additional Past Medical History / Comment(s): Nephrotic Syndrome as child, no 

problems since . Thyroid nodules.


History of Any Multi-Drug Resistant Organisms: None Reported


Past Surgical History:  Section


Additional Past Surgical History / Comment(s): Kidney biopsy .


Past Anesthesia/Blood Transfusion Reactions: No Reported Reaction


Past Psychological History: No Psychological Hx Reported


Smoking Status: Never smoker


Past Alcohol Use History: None Reported


Past Drug Use History: None Reported





- Past Family History


  ** Mother


Family Medical History: Cancer


Additional Family Medical History / Comment(s): Colorectal cancer.





Medications and Allergies


                                Home Medications











 Medication  Instructions  Recorded  Confirmed  Type


 


Fish Oil/Dha/Epa [Fish Oil 1,200 1 each PO DAILY 09/26/22 10/11/22 History





mg Fish Oil]    


 


Multivitamins, Thera [Multivitamin 1 tab PO DAILY 09/26/22 10/11/22 History





(formulary)]    








                                    Allergies











Allergy/AdvReac Type Severity Reaction Status Date / Time


 


[facility] [[facility] Allergy  Rash/Hives Verified 10/11/22 15:45














Physical Examination


Osteopathic Statement: *.  No significant issues noted on an osteopathic 

structural exam other than those noted in the History and Physical/Consult.

## 2022-10-14 NOTE — FL
Fluoroscopy

 

HISTORY: Thoracic kyphoplasty

 

37 seconds fluoroscopy time supplied to the referring clinician.  3 intraoperative C-arm images docum
ent the procedure. See dictated report from orthopedic surgery.

## 2022-10-14 NOTE — P.OP
Date of Procedure: 10/13/22


Preoperative Diagnosis: 


1. T4 unknonwn lesion


Postoperative Diagnosis: 


1. T4 unknonwn lesion


Procedure(s) Performed: 


1. T4 vertebral body biopsy using 3D [employer] image guidance ([id number], [id number])


Implants: 


None   


Anesthesia: GETA


Surgeon: Ishan Pal


Assistant #1: Luciana Preston


Estimated Blood Loss (ml): 5


IV fluids (ml): 200


Urine output (ml): 0


Pathology: other (X2 T4 vertebral body)


Condition: stable


Disposition: PACU


Indications for Procedure: 


53 yo female presented with finding of lytic lesion in T4 from work up of her 

neck. She has had MRI as well as full metestatic work up without any answers.  

We discussed different options and due to ominous look of T4 vertebral body we 

decided for T4 biposy at this time.  She agreed.  Risks and benefits discussed 

including risk of bleeding, infection, damage to surrounding tissue, nerve 

damage, need for further surgery, risk of anesthesia up to and including death. 

She was willing to assume these risks and all the risks of the procedure.  She 

was willing to proceed. 


Description of Procedure: 


The patient was seen and examined in the preoperative area.  All preoperative 

protocols were followed.  Informed consent was obtained risks and benefits of 

the procedure were discussed at length.  Risks including bleeding infection 

damage to the surrounding tissue and risk of reoperation were discussed with the

patient.  Risk of anesthesia up to and including death was a discussed with the 

patient.  These are outlined in the risk review.  They were willing to accept 

these risks and all of the risks of surgery.  The patient was given a weight-

based dose of antibiotics in the form of 2 g Ancef.  The patient was seen and 

evaluated by the anesthesia team who deemed them fit for surgery. The site was 

marked, the patient was willing to proceed with the procedure. 





The patient was transferred to the operative suite by the Department of 

anesthesia.  They were then drifted off to sleep by the department anesthesia 

and GETA was performed.  The patient tolerated this well.  Once confirmation of 

lines and ventilation the patient was transferred to a [prone Clive table very

 carefully].  All bony prominences including wrists, elbows, axilla, chest, 

hips, and thighs, and feet were padded very well.  Special attention was paid to

the genitalia and these were padded accordingly. SCDs were placed on bilateral 

lower extremities and were connected.  Arms were well padded and placed Dr. Londoned

well-padded thumbs down.  Once in position, again we confirmed good ventilation 

capabilities and that lines were running appropriately.  The patient's thoracic 

spine was then exposed.  1010s were placed outlining the incision site. Standard

alcohol was used to clean the incision site and allowed to dry. C-arm was used 

to biomark the patient and confirm level for incision which was marked with a 

skin marker.  Operative briefing was performed with all teams and everyone in 

agreement to proceed. The patient was then prepped and draped in a normal 

sterile fashion.  Timeout was then performed and all parties were in agreement 

with the procedure to be performed.  





18-gauge needle was then used to identify the T4 vertebral body on AP x-ray we 

then obtained a intraoperative 3-D C-arm spent with [employer] navigation.  We then

made a opening over the left-hand side and passed a navigated Jamshidi through 

the pedicle into the vertebral body.  Once an vertebral body we confirmed that 

we are within the lesion using [place] navigation as well as live fluoroscopy.  

We then passed the biopsy needle through the Jamshidi.  We also used syringes to

extract any tissue or soft tissue from this area as well as obtain sample.  Once

we obtained sample from this area the Jamshidi was removed the wound was 

irrigated and closed with Monocryl stitch.  It was dressed sterilely with a 

Band-Aid.





The patient was transferred back to their hospital bed atraumatically.   Patient

was then awakened and extubated by the department of anesthesia having tolerated

the procedure very well with no complications.  They were transferred to the 

postoperative care unit in stable condition.

## 2022-10-14 NOTE — P.OP
Date of Procedure: 10/13/22


Preoperative Diagnosis: 


1. T4 unknonwn lesion


Postoperative Diagnosis: 


1. T4 unknonwn lesion


Procedure(s) Performed: 


1. T4 vertebral body biopsy using 3D [employer] image guidance ([id number], [id number])


Implants: 


None   


Anesthesia: GETA


Surgeon: Ishan Pal


Assistant #1: Luciana Preston


Estimated Blood Loss (ml): 5


IV fluids (ml): 200


Urine output (ml): 0


Pathology: other (X2 T4 vertebral body)


Condition: stable


Disposition: PACU


Indications for Procedure: 


51 yo female presented with finding of lytic lesion in T4 from work up of her 

neck. She has had MRI as well as full metestatic work up without any answers.  

We discussed different options and due to ominous look of T4 vertebral body we 

decided for T4 biposy at this time.  She agreed.  Risks and benefits discussed 

including risk of bleeding, infection, damage to surrounding tissue, nerve 

damage, need for further surgery, risk of anesthesia up to and including death. 

She was willing to assume these risks and all the risks of the procedure.  She 

was willing to proceed. 


Description of Procedure: 


The patient was seen and examined in the preoperative area.  All preoperative 

protocols were followed.  Informed consent was obtained risks and benefits of 

the procedure were discussed at length.  Risks including bleeding infection 

damage to the surrounding tissue and risk of reoperation were discussed with the

patient.  Risk of anesthesia up to and including death was a discussed with the 

patient.  These are outlined in the risk review.  They were willing to accept 

these risks and all of the risks of surgery.  The patient was given a weight-

based dose of antibiotics in the form of 2 g Ancef.  The patient was seen and 

evaluated by the anesthesia team who deemed them fit for surgery. The site was 

marked, the patient was willing to proceed with the procedure. 





The patient was transferred to the operative suite by the Department of 

anesthesia.  They were then drifted off to sleep by the department anesthesia 

and GETA was performed.  The patient tolerated this well.  Once confirmation of 

lines and ventilation the patient was transferred to a [prone Clive table very

 carefully].  All bony prominences including wrists, elbows, axilla, chest, 

hips, and thighs, and feet were padded very well.  Special attention was paid to

the genitalia and these were padded accordingly. SCDs were placed on bilateral 

lower extremities and were connected.  Arms were well padded and placed Dr. Londoned

well-padded thumbs down.  Once in position, again we confirmed good ventilation 

capabilities and that lines were running appropriately.  The patient's thoracic 

spine was then exposed.  1010s were placed outlining the incision site. Standard

alcohol was used to clean the incision site and allowed to dry. C-arm was used 

to biomark the patient and confirm level for incision which was marked with a 

skin marker.  Operative briefing was performed with all teams and everyone in 

agreement to proceed. The patient was then prepped and draped in a normal 

sterile fashion.  Timeout was then performed and all parties were in agreement 

with the procedure to be performed.  





18-gauge needle was then used to identify the T4 vertebral body on AP x-ray we 

then obtained a intraoperative 3-D C-arm spent with [employer] navigation.  We then

made a opening over the left-hand side and passed a navigated Jamshidi through 

the pedicle into the vertebral body.  Once an vertebral body we confirmed that 

we are within the lesion using [employer] navigation as well as live fluoroscopy.  

We then passed the biopsy needle through the Jamshidi.  We also used syringes to

extract any tissue or soft tissue from this area as well as obtain sample.  Once

we obtained sample from this area the Jamshidi was removed the wound was 

irrigated and closed with Monocryl stitch.  It was dressed sterilely with a 

Band-Aid.





The patient was transferred back to their hospital bed atraumatically.   Patient

was then awakened and extubated by the department of anesthesia having tolerated

the procedure very well with no complications.  They were transferred to the 

postoperative care unit in stable condition.

## 2022-10-21 ENCOUNTER — HOSPITAL ENCOUNTER (OUTPATIENT)
Dept: HOSPITAL 47 - RADPROMAIN | Age: 52
Discharge: HOME | End: 2022-10-21
Attending: FAMILY MEDICINE
Payer: COMMERCIAL

## 2022-10-21 VITALS — DIASTOLIC BLOOD PRESSURE: 98 MMHG | SYSTOLIC BLOOD PRESSURE: 177 MMHG

## 2022-10-21 VITALS — HEART RATE: 55 BPM | RESPIRATION RATE: 16 BRPM

## 2022-10-21 VITALS — TEMPERATURE: 98.1 F

## 2022-10-21 DIAGNOSIS — E04.2: Primary | ICD-10-CM

## 2022-10-21 PROCEDURE — 10005 FNA BX W/US GDN 1ST LES: CPT

## 2022-10-21 PROCEDURE — 88173 CYTOPATH EVAL FNA REPORT: CPT

## 2022-10-21 PROCEDURE — 88305 TISSUE EXAM BY PATHOLOGIST: CPT

## 2022-10-21 PROCEDURE — 10006 FNA BX W/US GDN EA ADDL: CPT

## 2022-10-21 NOTE — US
EXAMINATION TYPE: US FNA thyroid each add lesion, US FNA thyroid first lesion

 

DATE OF EXAM: 10/21/2022

 

COMPARISON: NONE

 

HISTORY: Thyroid nodules.

 

Maximal barrier technique was utilized.  After informed consent, skin overlying the deep nodule withi
n the left lobe of the the thyroid gland was localized with ultrasound and the overlying skin prepped
 and draped. Ultrasound was utilized using sterile technique. Lidocaine was used for local anesthesia
.  Five passes with a 25-gauge needle were made into the nodule and aspirated specimen was submitted 
to cytology. Using similar technique 5 passes were made into the suspicious nodule in the right lobe 
of the thyroid gland with rim calcification.  Following the procedure hemostasis achieved.  No immedi
ate complication.  The patient discharged in stable condition.

 

IMPRESSION: STATUS POST ULTRASOUND GUIDED FINE NEEDLE ASPIRATION OF 2 DISCRETE THYROID NODULES, PATHO
LOGY IS PENDING.  THIS PROCEDURE WAS PERFORMED BY THE UNDERSIGNED.